# Patient Record
Sex: FEMALE | Race: WHITE
[De-identification: names, ages, dates, MRNs, and addresses within clinical notes are randomized per-mention and may not be internally consistent; named-entity substitution may affect disease eponyms.]

---

## 2018-06-08 ENCOUNTER — HOSPITAL ENCOUNTER (EMERGENCY)
Dept: HOSPITAL 25 - UCEAST | Age: 33
Discharge: HOME | End: 2018-06-08
Payer: COMMERCIAL

## 2018-06-08 ENCOUNTER — HOSPITAL ENCOUNTER (EMERGENCY)
Dept: HOSPITAL 25 - ED | Age: 33
Discharge: HOME | End: 2018-06-08
Payer: COMMERCIAL

## 2018-06-08 VITALS — SYSTOLIC BLOOD PRESSURE: 124 MMHG | DIASTOLIC BLOOD PRESSURE: 64 MMHG

## 2018-06-08 VITALS — SYSTOLIC BLOOD PRESSURE: 114 MMHG | DIASTOLIC BLOOD PRESSURE: 66 MMHG

## 2018-06-08 DIAGNOSIS — K57.32: Primary | ICD-10-CM

## 2018-06-08 DIAGNOSIS — F17.210: ICD-10-CM

## 2018-06-08 DIAGNOSIS — R10.31: Primary | ICD-10-CM

## 2018-06-08 DIAGNOSIS — R00.0: ICD-10-CM

## 2018-06-08 DIAGNOSIS — Z32.02: ICD-10-CM

## 2018-06-08 DIAGNOSIS — R11.0: ICD-10-CM

## 2018-06-08 DIAGNOSIS — Z97.5: ICD-10-CM

## 2018-06-08 DIAGNOSIS — R50.9: ICD-10-CM

## 2018-06-08 DIAGNOSIS — R19.7: ICD-10-CM

## 2018-06-08 LAB
BASOPHILS # BLD AUTO: 0.1 10^3/UL (ref 0–0.2)
EOSINOPHIL # BLD AUTO: 0.2 10^3/UL (ref 0–0.6)
HCT VFR BLD AUTO: 39 % (ref 35–47)
HGB BLD-MCNC: 13.1 G/DL (ref 12–16)
LYMPHOCYTES # BLD AUTO: 1.5 10^3/UL (ref 1–4.8)
MCH RBC QN AUTO: 31 PG (ref 27–31)
MCHC RBC AUTO-ENTMCNC: 34 G/DL (ref 31–36)
MCV RBC AUTO: 93 FL (ref 80–97)
MONOCYTES # BLD AUTO: 1.2 10^3/UL (ref 0–0.8)
NEUTROPHILS # BLD AUTO: 9.4 10^3/UL (ref 1.5–7.7)
NRBC # BLD AUTO: 0 10^3/UL
NRBC BLD QL AUTO: 0
PLATELET # BLD AUTO: 158 10^3/UL (ref 150–450)
RBC # BLD AUTO: 4.18 10^6/UL (ref 4–5.4)
WBC # BLD AUTO: 12.3 10^3/UL (ref 3.5–10.8)

## 2018-06-08 PROCEDURE — 36415 COLL VENOUS BLD VENIPUNCTURE: CPT

## 2018-06-08 PROCEDURE — 81015 MICROSCOPIC EXAM OF URINE: CPT

## 2018-06-08 PROCEDURE — 81003 URINALYSIS AUTO W/O SCOPE: CPT

## 2018-06-08 PROCEDURE — 84702 CHORIONIC GONADOTROPIN TEST: CPT

## 2018-06-08 PROCEDURE — 83690 ASSAY OF LIPASE: CPT

## 2018-06-08 PROCEDURE — 83605 ASSAY OF LACTIC ACID: CPT

## 2018-06-08 PROCEDURE — 74177 CT ABD & PELVIS W/CONTRAST: CPT

## 2018-06-08 PROCEDURE — 85025 COMPLETE CBC W/AUTO DIFF WBC: CPT

## 2018-06-08 PROCEDURE — 86140 C-REACTIVE PROTEIN: CPT

## 2018-06-08 PROCEDURE — 96374 THER/PROPH/DIAG INJ IV PUSH: CPT

## 2018-06-08 PROCEDURE — 80053 COMPREHEN METABOLIC PANEL: CPT

## 2018-06-08 PROCEDURE — 99284 EMERGENCY DEPT VISIT MOD MDM: CPT

## 2018-06-08 PROCEDURE — 93005 ELECTROCARDIOGRAM TRACING: CPT

## 2018-06-08 PROCEDURE — 99212 OFFICE O/P EST SF 10 MIN: CPT

## 2018-06-08 PROCEDURE — G0463 HOSPITAL OUTPT CLINIC VISIT: HCPCS

## 2018-06-08 NOTE — UC
Abdominal Pain Female HPI





- HPI Summary


HPI Summary: 





34 yo female presents with RLQ abdominal pain that began yesterday. Has 

worsened today. She reports some loose stools and diarrhea. No vomiting, but 

does feel nauseous and doesn't feel like eating. Still has her appendix. 

Noticed that she had a fever today and this made her concerned. Pt noticed that 

driving here, her RLQ pain hurt more when she went over any bumps in the car. 

Denies chills, SOB, chest pain, vomiting, hx of abdominal surgeries, dysuria, 

flank pain.





- History of Current Complaint


Hx Obtained From: Patient


Hx Last Menstrual Period: has  iud


Onset/Duration: Sudden Onset


Timing: Constant


Severity Initially: Mild


Severity Currently: Moderate


Pain Intensity: 6


Pain Scale Used: 0-10 Numeric


Location: Discrete At: RLQ


Radiates: No





<Manohar Yanez - Last Filed: 06/08/18 22:12>





<Cait Hughes - Last Filed: 06/10/18 07:04>





- History of Current Complaint


Chief Complaint: UCAbdominalPain


Stated Complaint: ABD PAIN


Time Seen by Provider: 06/08/18 15:16


Allergies/Adverse Reactions: 


 Allergies











Allergy/AdvReac Type Severity Reaction Status Date / Time


 


No Known Allergies Allergy   Verified 06/08/18 16:22














PMH/Surg Hx/FS Hx/Imm Hx





- Additional Past Medical History


Additional PMH: 





None


Previously Healthy: Yes





- Surgical History


Surgical History: None





- Family History


Known Family History: Positive: None


   Negative: Hypertension





- Social History


Occupation: Employed Full-time


Lives: With Family


Alcohol Use: None


Substance Use Type: None


Smoking Status (MU): Light Every Day Tobacco Smoker


Type: Cigarettes


Amount Used/How Often: 3/4ppd





- Immunization History


Most Recent Influenza Vaccination: 9/2017


Most Recent Tetanus Shot: 5/21/15


Most Recent Pneumonia Vaccination: Unknown





<Manohar Yanez - Last Filed: 06/08/18 22:12>





Review of Systems


Constitutional: Fever


Skin: Negative


Respiratory: Negative


Cardiovascular: Negative


Gastrointestinal: Abdominal Pain, Diarrhea, Nausea


Genitourinary: Negative


Neurovascular: Negative


Neurological: Negative


Psychological: Negative


All Other Systems Reviewed And Are Negative: Yes





<Manohar Yanez - Last Filed: 06/08/18 22:12>





Physical Exam





- Summary


Physical Exam Summary: 





GENERAL: NAD. WDWN. Walking with shuffling gait due to pain.


SKIN: No rashes, sores, lesions, or open wounds.


NECK: Supple. Nontender. No lymphadenopathy. 


CHEST:  CTAB. No r/r/w. No accessory muscle use. Breathing comfortably and in 

no distress.


CV:  RRR. Without m/r/g. Pulses intact. Brisk cap refill.


ABDOMEN: Soft. Severe TTP RLQ. Guarding RLQ. Positive rovsing's, psoas sign, 

and obturator sign. No CVA tenderness. Bowel sounds present


NEURO: Alert. CN II-XII grossly intact.


PSYCH: Age appropriate behavior.


Triage Information Reviewed: Yes


Vital Signs: 


 Initial Vital Signs











Temp  100.3 F   06/08/18 15:06


 


Pulse  65   06/08/18 15:06


 


Resp  18   06/08/18 15:06


 


BP  114/66   06/08/18 15:06


 


Pulse Ox  100   06/08/18 15:06














<Manohar Yanez - Last Filed: 06/08/18 22:12>


Vital Signs: 


 Initial Vital Signs











Temp  100.3 F   06/08/18 15:06


 


Pulse  65   06/08/18 15:06


 


Resp  18   06/08/18 15:06


 


BP  114/66   06/08/18 15:06


 


Pulse Ox  100   06/08/18 15:06














<Cait Hughes - Last Filed: 06/10/18 07:04>





Abd Pain Female Course/Dx





- Course


Course Of Treatment: UA with trace blood. Urine preg negative. Pt advised to go 

to ED for further workup for suspected appendicitis.





- Differential Dx/Diagnosis


Provider Diagnoses: RLQ pain





<Manohar Yanez - Last Filed: 06/08/18 22:12>





Discharge





- Sign-Out/Discharge


Documenting (check all that apply): Discharge/Admit/Transfer





- Billing Disposition and Condition


Condition: STABLE


Disposition: Home





<Manohar Yanez Last Filed: 06/08/18 22:12>





- Billing Disposition and Condition


Condition: STABLE


Disposition: Home





<Cait Hughes - Last Filed: 06/10/18 07:04>





- Discharge Plan


Condition: Stable


Disposition: HOME


Referrals: 


No Primary Care Phys,NOPCP [Primary Care Provider] - 


Additional Instructions: 


Please go directly to the ER for further evaluation of your suspected 

appendicitis

## 2018-06-08 NOTE — RAD
Indication: Right lower quadrant pain.



Contrast: Administered 75.1 ml of OMNIPAQUE 300 mg/ml



CT of the abdomen and pelvis was performed after oral and IV contrast administration.

Coronal and sagittal reconstructed images were obtained.



The lung bases demonstrate no pleural fluid, nodules or masses. Heart is of normal size

without evidence of pericardial effusion.



Liver is normal in size. No focal lesions or intrahepatic duct dilatation is noted. The

spleen is normal in size. Pancreas demonstrates no mass or pancreatic duct dilatation. The

common duct is not dilated. The gallbladder demonstrates no calcified gallstones,

pericholecystic fluid or wall thickening. No adrenal lesions are noted. The kidneys

demonstrate symmetric nephrograms without focal lesions. No retroperitoneal

lymphadenopathy is noted. No dilated loops of bowel are noted.



There is focal wall thickening of the mid ascending colon. There is wall thickening with

inflammatory changes and small amount of pericolonic fluid noted just adjacent to the

lower pole of the right kidney. This does not appear to BE appendicitis as a normal

appendix is visualized. This may represent diverticulitis or epiploic appendagitis. The

remainder of the colon is unremarkable. Small bowel is unremarkable. IUD is in place. No

adnexal masses are noted. Small to moderate amount of free fluid noted in the cul-de-sac.



The bony structures are otherwise unremarkable.



IMPRESSION: Normal air-filled appendix is noted.



In the mid portion of the ascending colon there is focal wall thickening with increased

soft tissue and pericolonic fluid noted. This likely represents inflammatory change. This

may represent diverticulitis or the possibility of epiploic appendagitis should BE

considered. Neoplastic process is considered less likely although not totally excluded.

## 2018-06-18 NOTE — ED
Joby GUZMAN Tiffany, scribed for Yo Esparza MD on 06/08/18 at 1640 .





Abdominal Pain/Female





- HPI Summary


HPI Summary: 


33 year old F presenting to [facility] complains of abdominal pain since 04:30 

yesterday. The patient rates the pain 7/10 in severity. Symptoms aggravated by 

nothing. Symptoms alleviated by nothing. Patient reports diarrhea, fever 104. 

Patient denies N/V, constipation, vaginal discharge, vaginal bleeding. 





- History of Current Complaint


Chief Complaint: EDFlankPain


Stated Complaint: ABD PAIN


Time Seen by Provider: 06/08/18 16:33


Hx Obtained From: Patient


Hx Last Menstrual Period: has  iud


Onset/Duration: Lasting Days - 04:30 yesterday, Still Present


Severity Currently: Moderate


Pain Intensity: 7


Pain Scale Used: 0-10 Numeric


Aggravating Factor(s): Nothing


Alleviating Factor(s): Nothing


Associated Signs and Symptoms: Positive: Negative - N/V, constipation, vaginal 

discharge, vaginal bleeding., Fever - 104, Diarrhea


Allergies/Adverse Reactions: 


 Allergies











Allergy/AdvReac Type Severity Reaction Status Date / Time


 


No Known Allergies Allergy   Verified 06/08/18 16:22











Home Medications: 


 Home Medications





Simethicone TAB* [Mylicon TAB*] 125 mg PO TID PRN 06/08/18 [History Confirmed 06 /08/18]











PMH/Surg Hx/FS Hx/Imm Hx


Previously Healthy: No - Hx cyst


Endocrine/Hematology History: 


   Denies: Hx Diabetes, Hx Thyroid Disease


Cardiovascular History: 


   Denies: Hx Hypertension


Respiratory History: 


   Denies: Hx Asthma, Hx Chronic Obstructive Pulmonary Disease (COPD)


GI History: 


   Denies: Hx Ulcer





- Surgical History


Surgery Procedure, Year, and Place: none


Infectious Disease History: No


Infectious Disease History: 


   Denies: Hx Clostridium Difficile, Hx Hepatitis, Hx Human Immunodeficiency 

Virus (HIV), Hx of Known/Suspected MRSA, Hx Shingles, Hx Tuberculosis, Hx Known/

Suspected VRE, Hx Known/Suspected VRSA, History Other Infectious Disease, 

Traveled Outside the [place] in Last 30 Days





- Family History


Known Family History: 


   Negative: Hypertension, Diabetes





- Social History


Alcohol Use: None


Hx Substance Use: No


Substance Use Type: Reports: None


Hx Tobacco Use: Yes


Smoking Status (MU): Light Every Day Tobacco Smoker


Type: Cigarettes


Amount Used/How Often: 3/4ppd





Review of Systems


Positive: Fever - 104


Gastrointestinal: Negative - constipation


Positive: Abdominal Pain, Diarrhea.  Negative: Vomiting, Nausea


Genitourinary: Negative - vaginal discharge, vaginal bleeding


All Other Systems Reviewed And Are Negative: Yes





Physical Exam





- Summary


Physical Exam Summary: 


VITAL SIGNS: Reviewed. 


GENERAL: Patient is a well-developed and nourished female who is lying 

comfortable in the stretcher. Patient is not in any acute respiratory distress. 


HEAD AND FACE: Normocephalic and atraumatic. 


EYES: PERRLA, EOMI x 2, No injected conjunctiva.


EARS: Hearing grossly intact. Ear canals and tympanic membranes are WNL.


MOUTH: Oropharynx within normal limits. 


NECK: Supple, trachea is midline, no adenopathy, no JVD.


CHEST: Symmetric, no tenderness at palpation 


LUNGS: Clear to auscultation bilaterally. No wheezing or crackles.


CVS: RRR, S1 and S2 present, no murmurs or gallops appreciated. 


ABDOMEN: RLQ tenderness


EXTREMITIES: FROM in all major joints, no edema, no cyanosis or clubbing.


NEURO: Alert and oriented x 3. No acute neurological deficits. Speech is normal.


SKIN: Dry and warm


Triage Information Reviewed: Yes


Vital Signs On Initial Exam: 


 Initial Vitals











Temp Pulse Resp BP Pulse Ox


 


 100.4 F   114   16   118/57   97 


 


 06/08/18 16:22  06/08/18 16:22  06/08/18 16:22  06/08/18 16:22  06/08/18 16:22











Vital Signs Reviewed: Yes





Diagnostics





- Vital Signs


 Vital Signs











  Temp Pulse Resp BP Pulse Ox


 


 06/08/18 16:22  100.4 F  114  16  118/57  97














- Laboratory


Lab Results: 


 Lab Results











  06/08/18 06/08/18 06/08/18 Range/Units





  16:47 16:47 16:47 


 


WBC  12.3 H    (3.5-10.8)  10^3/ul


 


RBC  4.18    (4.0-5.4)  10^6/ul


 


Hgb  13.1    (12.0-16.0)  g/dl


 


Hct  39    (35-47)  %


 


MCV  93    (80-97)  fL


 


MCH  31    (27-31)  pg


 


MCHC  34    (31-36)  g/dl


 


RDW  13    (10.5-15)  %


 


Plt Count  158    (150-450)  10^3/ul


 


MPV  9.1    (7.4-10.4)  um3


 


Neut % (Auto)  76.3    (38-83)  %


 


Lymph % (Auto)  12.5 L    (25-47)  %


 


Mono % (Auto)  9.4 H    (0-7)  %


 


Eos % (Auto)  1.2    (0-6)  %


 


Baso % (Auto)  0.6    (0-2)  %


 


Absolute Neuts (auto)  9.4 H    (1.5-7.7)  10^3/ul


 


Absolute Lymphs (auto)  1.5    (1.0-4.8)  10^3/ul


 


Absolute Monos (auto)  1.2 H    (0-0.8)  10^3/ul


 


Absolute Eos (auto)  0.2    (0-0.6)  10^3/ul


 


Absolute Basos (auto)  0.1    (0-0.2)  10^3/ul


 


Absolute Nucleated RBC  0    10^3/ul


 


Nucleated RBC %  0    


 


Sodium   137 L   (139-145)  mmol/L


 


Potassium   3.6   (3.5-5.0)  mmol/L


 


Chloride   105   (101-111)  mmol/L


 


Carbon Dioxide   24   (22-32)  mmol/L


 


Anion Gap   8   (2-11)  mmol/L


 


BUN   9   (6-24)  mg/dL


 


Creatinine   0.68   (0.51-0.95)  mg/dL


 


Est GFR ( Amer)   126.6   (>60)  


 


Est GFR (Non-Af Amer)   98.5   (>60)  


 


BUN/Creatinine Ratio   13.2   (8-20)  


 


Glucose   104 H   ()  mg/dL


 


Lactic Acid    0.4 L  (0.5-2.0)  mmol/L


 


Calcium   9.1   (8.6-10.3)  mg/dL


 


Total Bilirubin   0.80   (0.2-1.0)  mg/dL


 


AST   13   (13-39)  U/L


 


ALT   8   (7-52)  U/L


 


Alkaline Phosphatase   53   ()  U/L


 


C-Reactive Protein   48.72 H   (< 5.00)  mg/L


 


Total Protein   6.9   (6.4-8.9)  g/dL


 


Albumin   4.3   (3.2-5.2)  g/dL


 


Globulin   2.6   (2-4)  g/dL


 


Albumin/Globulin Ratio   1.7   (1-3)  


 


Lipase   16   (11.0-82.0)  U/L


 


Beta HCG, Quant   < 0.60   mIU/mL


 


Urine Color     


 


Urine Appearance     


 


Urine pH     (5-9)  


 


Ur Specific Gravity     (1.010-1.030)  


 


Urine Protein     (Negative)  


 


Urine Ketones     (Negative)  


 


Urine Blood     (Negative)  


 


Urine Nitrate     (Negative)  


 


Urine Bilirubin     (Negative)  


 


Urine Urobilinogen     (Negative)  


 


Ur Leukocyte Esterase     (Negative)  


 


Urine WBC (Auto)     (Absent)  


 


Urine RBC (Auto)     (Absent)  


 


Ur Squamous Epith Cells     (Absent)  


 


Urine Bacteria     (Absent)  


 


Urine Glucose     (Negative)  














  06/08/18 Range/Units





  18:23 


 


WBC   (3.5-10.8)  10^3/ul


 


RBC   (4.0-5.4)  10^6/ul


 


Hgb   (12.0-16.0)  g/dl


 


Hct   (35-47)  %


 


MCV   (80-97)  fL


 


MCH   (27-31)  pg


 


MCHC   (31-36)  g/dl


 


RDW   (10.5-15)  %


 


Plt Count   (150-450)  10^3/ul


 


MPV   (7.4-10.4)  um3


 


Neut % (Auto)   (38-83)  %


 


Lymph % (Auto)   (25-47)  %


 


Mono % (Auto)   (0-7)  %


 


Eos % (Auto)   (0-6)  %


 


Baso % (Auto)   (0-2)  %


 


Absolute Neuts (auto)   (1.5-7.7)  10^3/ul


 


Absolute Lymphs (auto)   (1.0-4.8)  10^3/ul


 


Absolute Monos (auto)   (0-0.8)  10^3/ul


 


Absolute Eos (auto)   (0-0.6)  10^3/ul


 


Absolute Basos (auto)   (0-0.2)  10^3/ul


 


Absolute Nucleated RBC   10^3/ul


 


Nucleated RBC %   


 


Sodium   (139-145)  mmol/L


 


Potassium   (3.5-5.0)  mmol/L


 


Chloride   (101-111)  mmol/L


 


Carbon Dioxide   (22-32)  mmol/L


 


Anion Gap   (2-11)  mmol/L


 


BUN   (6-24)  mg/dL


 


Creatinine   (0.51-0.95)  mg/dL


 


Est GFR ( Amer)   (>60)  


 


Est GFR (Non-Af Amer)   (>60)  


 


BUN/Creatinine Ratio   (8-20)  


 


Glucose   ()  mg/dL


 


Lactic Acid   (0.5-2.0)  mmol/L


 


Calcium   (8.6-10.3)  mg/dL


 


Total Bilirubin   (0.2-1.0)  mg/dL


 


AST   (13-39)  U/L


 


ALT   (7-52)  U/L


 


Alkaline Phosphatase   ()  U/L


 


C-Reactive Protein   (< 5.00)  mg/L


 


Total Protein   (6.4-8.9)  g/dL


 


Albumin   (3.2-5.2)  g/dL


 


Globulin   (2-4)  g/dL


 


Albumin/Globulin Ratio   (1-3)  


 


Lipase   (11.0-82.0)  U/L


 


Beta HCG, Quant   mIU/mL


 


Urine Color  Straw  


 


Urine Appearance  Clear  


 


Urine pH  6.0  (5-9)  


 


Ur Specific Gravity  1.001 L  (1.010-1.030)  


 


Urine Protein  Negative  (Negative)  


 


Urine Ketones  Negative  (Negative)  


 


Urine Blood  1+ A  (Negative)  


 


Urine Nitrate  Negative  (Negative)  


 


Urine Bilirubin  Negative  (Negative)  


 


Urine Urobilinogen  Negative  (Negative)  


 


Ur Leukocyte Esterase  Negative  (Negative)  


 


Urine WBC (Auto)  Absent  (Absent)  


 


Urine RBC (Auto)  Absent  (Absent)  


 


Ur Squamous Epith Cells  Present A  (Absent)  


 


Urine Bacteria  Absent  (Absent)  


 


Urine Glucose  Negative  (Negative)  











Result Diagrams: 


 06/08/18 16:47





 06/08/18 16:47


Lab Statement: Any lab studies that have been ordered have been reviewed, and 

results considered in the medical decision making process.





- CT


  ** Abd/Pel


CT Interpretation Completed By: Radiologist - Normal air-filled appendix is 

noted. In the mid portion of the ascending colon there is focal wall thickening 

with increased soft tissue and pericolonic fluid noted. This likely represents 

inflammatory change. This may represent diverticulitis or the possibility of 

epiploic appendagitis should BE considered. Neoplastic process is considered 

less likely although not totally excluded. ED physician has reviewed this 

report.





- EKG


  ** 16:46


Cardiac Rate: Tachycardia - 103 BPM


EKG Rhythm: Sinus Tachycardia


EKG Interpretation: No ST elevations





Abdominal Pain Fem Course/Dx





- Course


Course Of Treatment: Blood test shows a WBCs of 12.3 without bands.  Sodium 137

, glucose of 104, CRP is 40.7.  Urinalysis is negative for UTI.  Abdominopelvic 

CT impression: Normal.  Air filled  appendix is noted.  In the midportion of 

the ascending colon there is a focal wall thickening with increased soft tissue 

and pericolonic fluid noted.  This is likely represents inflammatory change.  

This may represent diverticulitis or possibility of epiploic appendicitis he 

should reconsider.  Neoplastic process is considered less likely although not 

totally excluded.  I believe that the patient has diverticulitis since she has 

tenderness to palpation,  WBC is increased and the CRP is also increased.  The 

patient was given ciprofloxacin and Flagyl.  Patient also was given IV fluids 

for hydration, morphine for the pain and the patient is feeling better.  

Therefore, the patient will be discharged home with follow-up with PCP.  I 

discussed all the findings and test results with the patient. Patient was 

instructed to return to the emergency room immediately if any of the symptoms 

return or worsens. Plan of care was discussed with the patient and understands 

and agrees. All questions were answered at patient satisfaction.  There were no 

further complaints or concerns.  Lung exam before discharge: CTA B/L. Good air 

exchange. No wheezing or crackles heard. CVS: S1 and S2 present. No murmurs 

appreciated. Patient is alert and oriented x 3. Patient is hemodynamically 

stable. Patient will be discharged home with follow up PCP in the next 2-3 days





- Diagnoses


Provider Diagnoses: 


 Diverticulitis








Discharge





- Sign-Out/Discharge


Documenting (check all that apply): Discharge/Admit/Transfer





- Discharge Plan


Condition: Stable


Disposition: HOME


Prescriptions: 


Ciprofloxacin TAB* [Cipro 500 MG TAB*] 500 mg PO BID #20 tab


metroNIDAZOLE [Flagyl 500 MG TAB] 500 mg PO TID #30 tab


Patient Education Materials:  Diverticulitis (ED)


Referrals: 


[facility] PHYSICIAN REFERRAL [Outside] - 3 Days


No Primary Care Phys,NOPCP [Primary Care Provider] - 


Additional Instructions: 


FOLLOW UP WITH PRIMARY CARE PROVIDER IN 3 DAYS. RETURN TO THE EMERGENCY 

DEPARTMENT FOR ANY WORSENING OR NEW SYMPTOMS.





The documentation as recorded by the Joby mcneal Tiffany accurately reflects 

the service I personally performed and the decisions made by me, Yo Esparza MD.

## 2018-12-21 ENCOUNTER — HOSPITAL ENCOUNTER (EMERGENCY)
Dept: HOSPITAL 25 - ED | Age: 33
Discharge: HOME | End: 2018-12-21
Payer: COMMERCIAL

## 2018-12-21 VITALS — DIASTOLIC BLOOD PRESSURE: 55 MMHG | SYSTOLIC BLOOD PRESSURE: 116 MMHG

## 2018-12-21 DIAGNOSIS — K52.9: ICD-10-CM

## 2018-12-21 DIAGNOSIS — K58.9: Primary | ICD-10-CM

## 2018-12-21 DIAGNOSIS — F17.210: ICD-10-CM

## 2018-12-21 LAB
ALBUMIN SERPL BCG-MCNC: 4.3 G/DL (ref 3.2–5.2)
ALBUMIN/GLOB SERPL: 1.9 {RATIO} (ref 1–3)
ALP SERPL-CCNC: 46 U/L (ref 34–104)
ALT SERPL W P-5'-P-CCNC: 12 U/L (ref 7–52)
ANION GAP SERPL CALC-SCNC: 4 MMOL/L (ref 2–11)
AST SERPL-CCNC: 15 U/L (ref 13–39)
BASOPHILS # BLD AUTO: 0 10^3/UL (ref 0–0.2)
BUN SERPL-MCNC: 12 MG/DL (ref 6–24)
BUN/CREAT SERPL: 17.9 (ref 8–20)
CALCIUM SERPL-MCNC: 9.3 MG/DL (ref 8.6–10.3)
CHLORIDE SERPL-SCNC: 111 MMOL/L (ref 101–111)
EOSINOPHIL # BLD AUTO: 0 10^3/UL (ref 0–0.6)
GLOBULIN SER CALC-MCNC: 2.3 G/DL (ref 2–4)
GLUCOSE SERPL-MCNC: 118 MG/DL (ref 70–100)
HCG SERPL QL: < 0.6 MIU/ML
HCO3 SERPL-SCNC: 25 MMOL/L (ref 22–32)
HCT VFR BLD AUTO: 39 % (ref 35–47)
HGB BLD-MCNC: 13.3 G/DL (ref 12–16)
LYMPHOCYTES # BLD AUTO: 0.5 10^3/UL (ref 1–4.8)
MCH RBC QN AUTO: 32 PG (ref 27–31)
MCHC RBC AUTO-ENTMCNC: 34 G/DL (ref 31–36)
MCV RBC AUTO: 93 FL (ref 80–97)
MONOCYTES # BLD AUTO: 1 10^3/UL (ref 0–0.8)
NEUTROPHILS # BLD AUTO: 12.4 10^3/UL (ref 1.5–7.7)
NRBC # BLD AUTO: 0 10^3/UL
NRBC BLD QL AUTO: 0.1
PLATELET # BLD AUTO: 173 10^3/UL (ref 150–450)
POTASSIUM SERPL-SCNC: 3.5 MMOL/L (ref 3.5–5)
PROT SERPL-MCNC: 6.6 G/DL (ref 6.4–8.9)
RBC # BLD AUTO: 4.21 10^6/UL (ref 4–5.4)
SODIUM SERPL-SCNC: 140 MMOL/L (ref 135–145)
WBC # BLD AUTO: 14 10^3/UL (ref 3.5–10.8)

## 2018-12-21 PROCEDURE — 96375 TX/PRO/DX INJ NEW DRUG ADDON: CPT

## 2018-12-21 PROCEDURE — 81003 URINALYSIS AUTO W/O SCOPE: CPT

## 2018-12-21 PROCEDURE — 80053 COMPREHEN METABOLIC PANEL: CPT

## 2018-12-21 PROCEDURE — 83690 ASSAY OF LIPASE: CPT

## 2018-12-21 PROCEDURE — 84702 CHORIONIC GONADOTROPIN TEST: CPT

## 2018-12-21 PROCEDURE — 76830 TRANSVAGINAL US NON-OB: CPT

## 2018-12-21 PROCEDURE — 96374 THER/PROPH/DIAG INJ IV PUSH: CPT

## 2018-12-21 PROCEDURE — 85025 COMPLETE CBC W/AUTO DIFF WBC: CPT

## 2018-12-21 PROCEDURE — 76705 ECHO EXAM OF ABDOMEN: CPT

## 2018-12-21 PROCEDURE — 36415 COLL VENOUS BLD VENIPUNCTURE: CPT

## 2018-12-21 PROCEDURE — 96361 HYDRATE IV INFUSION ADD-ON: CPT

## 2018-12-21 PROCEDURE — 99282 EMERGENCY DEPT VISIT SF MDM: CPT

## 2018-12-21 PROCEDURE — 86140 C-REACTIVE PROTEIN: CPT

## 2018-12-21 PROCEDURE — 83605 ASSAY OF LACTIC ACID: CPT

## 2018-12-21 PROCEDURE — 74177 CT ABD & PELVIS W/CONTRAST: CPT

## 2018-12-21 RX ADMIN — MORPHINE SULFATE ONE MG: 4 INJECTION INTRAVENOUS at 11:14

## 2018-12-21 RX ADMIN — ONDANSETRON ONE MG: 2 INJECTION INTRAMUSCULAR; INTRAVENOUS at 11:14

## 2018-12-21 RX ADMIN — SODIUM CHLORIDE ONE MLS/HR: 900 IRRIGANT IRRIGATION at 11:14

## 2018-12-21 RX ADMIN — IOHEXOL ONE ML: 300 INJECTION, SOLUTION INTRAVENOUS at 13:51

## 2018-12-21 NOTE — ED
Abdominal Pain/Female





- HPI Summary


HPI Summary: 


This patient is a 33 year old F presenting to [facility] with a chief complaint of 

abdominal pain since this morning. She is experiencing intermittent pain in her 

RLQ radiating to her RUQ. 


The patient rates the pain 4/10 in severity. She reports nausea, diarrhea. She 

states her nausea does not make her feel like she needs to vomit. Her last BM 

was 2 hours ago. Patient denies hematuria, dysuria ,vomiting 


The patient reports that she has to walk very slowly. Patient reports a Hx of 

ovarian cysts and diverticulitis. Patient has an IUD. 











- History of Current Complaint


Chief Complaint: EDAbdPain


Stated Complaint: PAIN IN RIGHT ABDOMINAL


Time Seen by Provider: 12/21/18 10:56


Hx Obtained From: Patient


Hx Last Menstrual Period: has  iud


Onset/Duration: Sudden Onset, Lasting Hours, Still Present


Timing: Intermittent Episode Lasting


Severity Initially: Moderate


Severity Currently: Moderate


Pain Intensity: 7


Pain Scale Used: 0-10 Numeric


Location: Discrete At: RUQ, Discrete At: RLQ, Suprapubic


Radiates: Yes


Radiates to: RLQ


Associated Signs and Symptoms: Positive: Nausea, Diarrhea





- Risk Factors


Ectopic Pregnancy Risk Factor: IUD Use


Allergies/Adverse Reactions: 


 Allergies











Allergy/AdvReac Type Severity Reaction Status Date / Time


 


No Known Allergies Allergy   Verified 06/08/18 16:22














PMH/Surg Hx/FS Hx/Imm Hx


Endocrine/Hematology History: 


   Denies: Hx Diabetes, Hx Thyroid Disease


Cardiovascular History: 


   Denies: Hx Hypertension


Respiratory History: 


   Denies: Hx Asthma, Hx Chronic Obstructive Pulmonary Disease (COPD)


GI History: 


   Denies: Hx Ulcer





- Surgical History


Surgery Procedure, Year, and Place: none


Infectious Disease History: No


Infectious Disease History: 


   Denies: Hx Clostridium Difficile, Hx Hepatitis, Hx Human Immunodeficiency 

Virus (HIV), Hx of Known/Suspected MRSA, Hx Shingles, Hx Tuberculosis, Hx Known/

Suspected VRE, Hx Known/Suspected VRSA, History Other Infectious Disease, 

Traveled Outside the [place] in Last 30 Days





- Family History


Known Family History: Positive: Other - denies ht,dm


   Negative: Hypertension, Diabetes





- Social History


Alcohol Use: None


Hx Substance Use: No


Substance Use Type: Reports: None


Hx Tobacco Use: Yes


Smoking Status (MU): Light Every Day Tobacco Smoker


Type: Cigarettes


Amount Used/How Often: 3/4ppd





Review of Systems


Negative: Fever, Chills


Negative: Erythema


Negative: Sore Throat


Negative: Chest Pain


Negative: Shortness Of Breath, Cough


Positive: Abdominal Pain, Diarrhea, Nausea.  Negative: Vomiting


Negative: dysuria, hematuria


Negative: Myalgia, Edema


Negative: Rash


Positive: Other - Neg: Dizziness


All Other Systems Reviewed And Are Negative: No





Physical Exam





- Summary


Physical Exam Summary: 





 Constitutional: Well-developed, Well-nourished, Alert. (-) Distressed


Skin: Warm, Dry


HENT: Normocephalic; Atraumatic


Eyes: Conjunctiva normal


Neck: Musculoskeletal ROM normal neck. (-) JVD, (-) Stridor, (-) Tracheal 

deviation


Cardio: Rhythm regular, rate normal, Heart sounds normal; Intact distal pulses; 

The pedal pulses are 2+ and symmetric. Radial pulses are 2+ and symmetric. (-) 

Murmur


Pulmonary/Chest wall: Effort normal. (-) Respiratory distress, (-) Wheezes, (-) 

Rales


Abd: Soft, (-) Distension, (-) Rebound. Tender in RUQ, RLQ, and suprapubic with 

guarding. 


Musculoskeletal: (-) Edema


Lymph: (-) Cervical adenopathy


Neuro: Alert, Oriented x3


Psych: Mood and affect Normal





Triage Information Reviewed: Yes


Vital Signs On Initial Exam: 


 Initial Vitals











Temp Pulse Resp BP Pulse Ox


 


 98.3 F   90   18   113/45   99 


 


 12/21/18 10:25  12/21/18 10:25  12/21/18 10:25  12/21/18 10:25  12/21/18 10:25











Vital Signs Reviewed: Yes





Diagnostics





- Vital Signs


 Vital Signs











  Temp Pulse Resp BP Pulse Ox


 


 12/21/18 11:00   97    100


 


 12/21/18 10:59   105   112/51  100


 


 12/21/18 10:58   97    100


 


 12/21/18 10:25  98.3 F  90  18  113/45  99














- Laboratory


Result Diagrams: 


 12/21/18 11:18





 12/21/18 11:18


Lab Statement: Any lab studies that have been ordered have been reviewed, and 

results considered in the medical decision making process.





- CT


  ** CT ABD/PEL


CT Interpretation Completed By: Radiologist


Summary of CT Findings: Again noted is inflammatory change and fluid tracking 

along the right paracolic gutter similar to the prevous examination. This may 

indicate the presence of an infectious or noninfectious inflammatory colitis, 

including inflammatory bowel disease. The appendix is normal. ED Provider has 

reviewed this report.





- Ultrasound


  ** No standard instances


Ultrasound Interpretation Completed By: Radiologist


Summary of Ultrasound Findings: U/S Gallbladder: No evidence of cholelithiasis 

or biliary duct dilation. ED Provider has reviewed this report.  Transvaginal U/

S: IUD in appropriate location. No adnexal masses are noted. Small amount of 

free fluid is noted in the cul-de-sac. ED Provider has reviewed this report.





Re-Evaluation





- Re-Evaluation


  ** First Eval


Re-Evaluation Time: 14:28


Comment: Discussed imaging results with patient and treatment with patient.





Abdominal Pain Fem Course/Dx





- Course


Course Of Treatment: This patient is a 33 year old F presenting to [facility] with a 

chief complaint of abdominal pain since this morning. She is experiencing 

intermittent pain in her RLQ radiating to her RUQ. U/S gallbladder was 

unremarkable for cholelthiasis. Transvaginal U/S and CT ABD/PEL were remarkable 

for colitis and IBS. Colitis is recurrent for this patient. She will be 

discharged and prescribed antibiotics and referred to GI and [facility] for follow-up. This plan was discussed with the patient and she is 

agreeable with this plan.





- Diagnoses


Provider Diagnoses: 


 Colitis, Inflammatory bowel disease








Discharge





- Sign-Out/Discharge


Documenting (check all that apply): Patient Departure - Discharge





- Discharge Plan


Condition: Stable


Disposition: HOME


Prescriptions: 


Ciprofloxacin TAB* [Cipro 500 MG TAB*] 500 mg PO BID #14 tab


HYDROcodone/ACETAMIN 5-325 MG* [Norco 5-325 TAB*] 1 tab PO Q6H PRN #20 tab MDD 4


 PRN Reason: Pain/Inflammation


metroNIDAZOLE [Flagyl 500 MG TAB] 500 mg PO TID #21 tab


Patient Education Materials:  Crohn Disease (ED), Colitis (ED)


Referrals: 


[facility] [Outside]


[facility] [Provider Group]


Additional Instructions: 


Return to ED with any new or worsening symptoms. Follow up with GI referral. 

See [facility] in 1-2 days. Treat with prescribed antibiotics. 





- Attestation Statements


Document Initiated by Scribe: Yes


Documenting Scribe: Khris Salazar


Provider For Whom Scribe is Documenting (Include Credential): MD Anika Sousaibmaliha Attestation: 


IKhris scribed for Enrico Wolf MD on 12/21/18 at 1441. 


Status of Scribe Document: Ready